# Patient Record
Sex: MALE | Race: WHITE | NOT HISPANIC OR LATINO | URBAN - METROPOLITAN AREA
[De-identification: names, ages, dates, MRNs, and addresses within clinical notes are randomized per-mention and may not be internally consistent; named-entity substitution may affect disease eponyms.]

---
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
## 2025-06-02 ENCOUNTER — APPOINTMENT (OUTPATIENT)
Dept: PLASTIC SURGERY | Facility: CLINIC | Age: 20
End: 2025-06-02
Payer: COMMERCIAL

## 2025-06-02 VITALS
WEIGHT: 132 LBS | HEART RATE: 80 BPM | BODY MASS INDEX: 18.48 KG/M2 | HEIGHT: 71 IN | SYSTOLIC BLOOD PRESSURE: 122 MMHG | DIASTOLIC BLOOD PRESSURE: 79 MMHG

## 2025-06-02 DIAGNOSIS — L91.0 KELOID SCAR: Primary | ICD-10-CM

## 2025-06-02 PROCEDURE — 99213 OFFICE O/P EST LOW 20 MIN: CPT | Performed by: SURGERY

## 2025-06-02 PROCEDURE — 3008F BODY MASS INDEX DOCD: CPT | Performed by: SURGERY

## 2025-06-02 PROCEDURE — 1036F TOBACCO NON-USER: CPT | Performed by: SURGERY

## 2025-06-02 NOTE — PROGRESS NOTES
Clinic Note    Reason For Visit  Bilateral ear keloid scars    History Of Present Illness  Hugo Farias is a 20 y.o. male presenting with bilateral ear keloid scars.  Patient previously underwent bilateral otoplasty procedure 2 years ago.  He is overall very happy with the appearance of the ears but did develop bilateral postauricular keloids along the incision approximately 1 year ago.  He has noticed they have increased in size especially on the right and is becoming more symptomatic.  Therefore he presents to discuss treatment options.    Of note, the patient is currently attending college in [place] and does have summer classes but will be back home several times for various reasons.     Past Medical History  He has a past medical history of Contusion of lower back and pelvis, initial encounter (12/03/2021), Displaced fracture of shaft of right clavicle, initial encounter for closed fracture (03/11/2021), Strain of other muscle(s) and tendon(s) at lower leg level, left leg, initial encounter (12/03/2021), Unspecified fracture of navicular (scaphoid) bone of left wrist, initial encounter for closed fracture (03/20/2020), and Unspecified sprain of right wrist, initial encounter (07/30/2020).    Surgical History  He has a past surgical history that includes Other surgical history (02/21/2020).     Social History  He reports that he has never smoked. He has never used smokeless tobacco. No history on file for alcohol use and drug use.    Allergies  Patient has no known allergies.    Review of Systems  Negative other than what is included in the HPI.      Physical Exam  On exam, Hugo Farias is well-appearing and well-developed.  he is breathing comfortably on room air and is in no distress.  Focused examination of His affected region reveals:     Excellent position of bilateral external ears  Large postauricular keloid on the right measuring approximately 3 cm in greatest dimension and a slightly  smaller 1 on the left measuring approximately 2 cm in greatest dimension.         Relevant Results      Assessment/Plan     Hugo Farias is a 20 y.o. male with bilateral ear keloids. We discussed that this is likely the result of the inciting trauma and the patient's propensity for an increased risk of keloid formation. I explained to the family that keloid formation is at least partially genetic. Some patients are at high risk for developing hypertrophic scarring and keloids. Therefore, any surgical intervention often carries a high risk of keloid recurrence.     We discussed treatment options including steroid injection and excision with an adjunct such as steroid injection. Steroid injections can often improve symptoms and sometimes can improve the characteristics of the keloid but is unlikely to cause regression of the scar that has already formed. Excision would  be performed under general anesthesia and would involve remove the entire keloid and repair of the wound. Steroid will be injected at the same time to decrease the risk of keloid recurrence. Another alternative is no treatment at all, which will leave the patient with the current keloid that is present.    After discussing the indication, alternatives, and risks of the treatment options, we elected to move forward with serial steroid injection in the office.  A verbal consent was obtained from the patient.    Using aseptic technique, I then cleaned both the keloid site with alcohol swabs.  We then took Kenalog 40 and injected 1 cc total in the right ear keloid and 0.7 cc in the left ear keloid.  Patient tolerated the procedure well and I then applied Band-Aid dressing to both areas.  I look forward to seeing him in approximately 1 month for another round of Kenalog injection      I spent 20 minutes in the professional and overall care of this patient.      Jabier Irvin MD

## 2025-07-18 ENCOUNTER — APPOINTMENT (OUTPATIENT)
Facility: CLINIC | Age: 20
End: 2025-07-18
Payer: COMMERCIAL

## 2025-07-18 VITALS
WEIGHT: 126 LBS | RESPIRATION RATE: 18 BRPM | OXYGEN SATURATION: 98 % | BODY MASS INDEX: 17.64 KG/M2 | TEMPERATURE: 98.6 F | DIASTOLIC BLOOD PRESSURE: 77 MMHG | SYSTOLIC BLOOD PRESSURE: 116 MMHG | HEART RATE: 67 BPM | HEIGHT: 71 IN

## 2025-07-18 DIAGNOSIS — L91.0 KELOID SCAR: Primary | ICD-10-CM

## 2025-07-18 NOTE — PROGRESS NOTES
Clinic Note    Reason For Visit  Bilateral ear keloid scars    History Of Present Illness  Hugo Farias is a 20 y.o. male presenting with bilateral ear keloid scars.  Patient previously underwent bilateral otoplasty procedure 2 years ago.  He is overall very happy with the appearance of the ears but did develop bilateral postauricular keloids along the incision approximately 1 year ago.  He has noticed they have increased in size especially on the right and is becoming more symptomatic.      He underwent 1 round of steroid injection last month and now presents for another round of injections.  The patient reports that he has noticed that the left ear keloid appears to be smaller in the right ear keloid appears to be softer.  He has not had any complications associated with the injection.     Of note, the patient is currently attending college in [place] and does have summer classes but will be back home several times for various reasons.     Past Medical History  He has a past medical history of Contusion of lower back and pelvis, initial encounter (12/03/2021), Displaced fracture of shaft of right clavicle, initial encounter for closed fracture (03/11/2021), Strain of other muscle(s) and tendon(s) at lower leg level, left leg, initial encounter (12/03/2021), Unspecified fracture of navicular (scaphoid) bone of left wrist, initial encounter for closed fracture (03/20/2020), and Unspecified sprain of right wrist, initial encounter (07/30/2020).    Surgical History  He has a past surgical history that includes Other surgical history (02/21/2020).     Social History  He reports that he has never smoked. He has never used smokeless tobacco. No history on file for alcohol use and drug use.    Allergies  Patient has no known allergies.    Review of Systems  Negative other than what is included in the HPI.      Physical Exam  On exam, Hugo Farias is well-appearing and well-developed.  he is breathing  comfortably on room air and is in no distress.  Focused examination of His affected region reveals:     Excellent position of bilateral external ears  Large postauricular keloid on the right measuring approximately 3 cm in greatest dimension and a slightly smaller 1 on the left measuring approximately 2 cm in greatest dimension.    Bilateral keloids appears to be softer and less firm compared to the last visit.         Relevant Results      Assessment/Plan     Hugo Farias is a 20 y.o. male with bilateral ear keloids.  He does appear to be some early response to the steroid injection and therefore we elected to move forward with another steroid injection in the office.  A verbal consent was obtained from the patient.    Using aseptic technique, I then cleaned both the keloid site with alcohol swabs.  We then took Kenalog 40 and injected 1 cc total in the right ear keloid and 0.9 cc in the left ear keloid.  Patient tolerated the procedure well and I then applied Band-Aid dressing to both areas.  I look forward to seeing him in approximately 1 month or when the patient is back in town for another round of Kenalog injection        Jabier Irvin MD